# Patient Record
Sex: FEMALE | Race: OTHER | HISPANIC OR LATINO | Employment: FULL TIME | URBAN - METROPOLITAN AREA
[De-identification: names, ages, dates, MRNs, and addresses within clinical notes are randomized per-mention and may not be internally consistent; named-entity substitution may affect disease eponyms.]

---

## 2020-07-07 ENCOUNTER — HOSPITAL ENCOUNTER (EMERGENCY)
Facility: HOSPITAL | Age: 35
Discharge: HOME | End: 2020-07-07
Attending: EMERGENCY MEDICINE
Payer: MEDICAID

## 2020-07-07 VITALS
RESPIRATION RATE: 18 BRPM | HEART RATE: 91 BPM | DIASTOLIC BLOOD PRESSURE: 71 MMHG | SYSTOLIC BLOOD PRESSURE: 109 MMHG | OXYGEN SATURATION: 97 % | BODY MASS INDEX: 26.75 KG/M2 | HEIGHT: 57 IN | WEIGHT: 124 LBS | TEMPERATURE: 99 F

## 2020-07-07 DIAGNOSIS — Z20.822 CLOSE EXPOSURE TO COVID-19 VIRUS: Primary | ICD-10-CM

## 2020-07-07 LAB — SARS-COV-2 RDRP RESP QL NAA+PROBE: NEGATIVE

## 2020-07-07 PROCEDURE — 99282 EMERGENCY DEPT VISIT SF MDM: CPT | Mod: ,,, | Performed by: EMERGENCY MEDICINE

## 2020-07-07 PROCEDURE — 99282 PR EMERGENCY DEPT VISIT,LEVEL II: ICD-10-PCS | Mod: ,,, | Performed by: EMERGENCY MEDICINE

## 2020-07-07 PROCEDURE — 99282 EMERGENCY DEPT VISIT SF MDM: CPT

## 2020-07-07 PROCEDURE — U0002 COVID-19 LAB TEST NON-CDC: HCPCS

## 2020-07-07 NOTE — ED NOTES
LOC: The patient is awake, alert and aware of environment with an appropriate affect, the patient is oriented x 3 and speaking appropriate.  APPEARANCE: Patient resting comfortably and in no acute distress, patient is clean and well groomed, patient's clothing is properly fastened.  SKIN: The skin is warm and dry, patient has normal skin turgor and moist mucus membranes.  RESPIRATORY: Airway is open and patent, respirations are spontaneous, patient has a normal effort and rate.  CARDIAC: Patient has a normal rate and rhythm, no periphreal edema noted, capillary refill < 3 seconds.  ABDOMEN: Soft and non tender to palpation, no distention noted.  NEUROLOGIC: PERRL, facial expression is symmetrical, patient moving all extremities, normal sensation in all extremities when touched with a finger.

## 2020-07-07 NOTE — ED PROVIDER NOTES
CC: COVID-19 Concerns (sister tested + , here with daughter)      History provided by:   Patient     HPI: Angle Adame is a 35 y.o. year old female who presents to the ED complaining of sore throat, rhinorrhea, watery eyes, cough that started yesterday.  Her sister tested positive for COVID-19 today.  She lives in a household with 3 other family members, some of them have URI symptoms  Denies chest pain shortness of breath or fever no diarrhea no vomiting  No loss of taste or smell    Denies any medical problems  Meds: took just NyQuil last night she is not on prescription medication  no allergies        No past medical history on file.  No past surgical history on file.  No family history on file.  No current facility-administered medications on file prior to encounter.      No current outpatient medications on file prior to encounter.     Patient has no allergy information on record.  Social History     Socioeconomic History    Marital status: Single     Spouse name: Not on file    Number of children: Not on file    Years of education: Not on file    Highest education level: Not on file   Occupational History    Not on file   Social Needs    Financial resource strain: Not on file    Food insecurity     Worry: Not on file     Inability: Not on file    Transportation needs     Medical: Not on file     Non-medical: Not on file   Tobacco Use    Smoking status: Not on file   Substance and Sexual Activity    Alcohol use: Not on file    Drug use: Not on file    Sexual activity: Not on file   Lifestyle    Physical activity     Days per week: Not on file     Minutes per session: Not on file    Stress: Not on file   Relationships    Social connections     Talks on phone: Not on file     Gets together: Not on file     Attends Zoroastrianism service: Not on file     Active member of club or organization: Not on file     Attends meetings of clubs or organizations: Not on file     Relationship status: Not on file    Other Topics Concern    Not on file   Social History Narrative    Not on file       ROS:     Constitutional : neg for fever, neg for weakness  HENT neg for head injury, positive for sore throat, positive for runny nose  Eyes: neg for visual changes, neg for eye pain  Resp neg for SOB, pos for cough  Cardiac  neg for chest pain, neg for palpitations  GI neg for abd pain, neg for nausea, neg for vomiting   neg for urinary changes  Neuro neg for focal weakness or numbness    ALL: Patient has no allergy information on record.    PHYSICAL EXAM:  Vitals:    07/07/20 1551   BP: 109/71   Pulse: 91   Resp: 18   Temp: 99.3 °F (37.4 °C)         PHYSICAL EXAM:     general: comfortable, in no acute distress, pleasant, well nourished  VS: triage VS reviewed  HENT: NC/AT  CV: RRR, no  murmurs, no rubs, no gallops, no LE edema  Resp: comfortable breathing, speaks in full sentences, CTAB, no wheezing, no crackles, no ronchi  ABD:  soft, ND, + normal BS, NT  Neuro: AAO x 3, face symmetric, speech normal              DATA & INTERVENTIONS:    LABS reviewed:  Labs Reviewed   SARS-COV-2 RNA AMPLIFICATION, QUAL       RADIOLOGY reviewed:  Imaging Results    None         MEDICATIONS/FLUIDS:  Medications - No data to display      MDM:  Angle Adame is a 35 y.o. year old female who presents to the ED complaining of rhinorrhea, watery eyes, cough, sore throat, close contact with COVID-19 %    Patient looks well vitals within normal limits, lungs clear to auscultation  Will send COVID-19 testing  Signs and symptoms that will require return to the emergency department discussed with the patient  Discussed p.r.n. Tylenol for fever and myalgia, p.r.n. Robitussin for cough, to return to the ED for shortness of breath high fever or any other concerns  Discussed isolation and also isolation of close contacts        IMPRESSION:  1.)  COVID-19 exposure  2.)  URI symptoms    Dispo: Discharge    Critical Care Time: N/A       Viri Nichole  MD  07/08/20 3784

## 2020-07-07 NOTE — Clinical Note
"Angle "Phoebe Adame was seen and treated in our emergency department on 7/7/2020.     COVID-19 is present in our communities across the state. There is limited testing for COVID at this time, so not all patients can be tested. In this situation, your employee meets the following criteria:    Angle Adame has met the criteria for COVID-19 testing based upon symptoms, travel, and/or potential exposure. The test has been completed and is pending results at this time. During this time the employee is not able to work and should be quarantined per the Centers for Disease Control timelines.     If you have any questions or concerns, or if I can be of further assistance, please do not hesitate to contact me.    Sincerely,             Viri Nichole MD"